# Patient Record
Sex: FEMALE | Race: WHITE | NOT HISPANIC OR LATINO | Employment: FULL TIME | URBAN - METROPOLITAN AREA
[De-identification: names, ages, dates, MRNs, and addresses within clinical notes are randomized per-mention and may not be internally consistent; named-entity substitution may affect disease eponyms.]

---

## 2017-02-08 ENCOUNTER — TELEPHONE (OUTPATIENT)
Dept: OTOLARYNGOLOGY | Facility: CLINIC | Age: 57
End: 2017-02-08

## 2017-02-09 ENCOUNTER — TELEPHONE (OUTPATIENT)
Dept: OTOLARYNGOLOGY | Facility: CLINIC | Age: 57
End: 2017-02-09

## 2017-03-22 ENCOUNTER — CLINICAL SUPPORT (OUTPATIENT)
Dept: INFECTIOUS DISEASES | Facility: CLINIC | Age: 57
End: 2017-03-22
Payer: COMMERCIAL

## 2017-03-22 ENCOUNTER — OFFICE VISIT (OUTPATIENT)
Dept: OTOLARYNGOLOGY | Facility: CLINIC | Age: 57
End: 2017-03-22
Payer: COMMERCIAL

## 2017-03-22 ENCOUNTER — CLINICAL SUPPORT (OUTPATIENT)
Dept: AUDIOLOGY | Facility: CLINIC | Age: 57
End: 2017-03-22
Payer: COMMERCIAL

## 2017-03-22 VITALS — BODY MASS INDEX: 40.14 KG/M2 | HEIGHT: 65 IN | WEIGHT: 240.94 LBS

## 2017-03-22 DIAGNOSIS — H90.3 SENSORINEURAL HEARING LOSS, BILATERAL: Primary | ICD-10-CM

## 2017-03-22 PROCEDURE — 99999 PR PBB SHADOW E&M-EST. PATIENT-LVL III: CPT | Mod: PBBFAC,,, | Performed by: OTOLARYNGOLOGY

## 2017-03-22 PROCEDURE — 99203 OFFICE O/P NEW LOW 30 MIN: CPT | Mod: S$GLB,,, | Performed by: OTOLARYNGOLOGY

## 2017-03-22 PROCEDURE — 92550 TYMPANOMETRY & REFLEX THRESH: CPT | Mod: S$GLB,,, | Performed by: AUDIOLOGIST

## 2017-03-22 PROCEDURE — 92557 COMPREHENSIVE HEARING TEST: CPT | Mod: S$GLB,,, | Performed by: AUDIOLOGIST

## 2017-03-22 PROCEDURE — 90670 PCV13 VACCINE IM: CPT | Mod: S$GLB,,, | Performed by: INTERNAL MEDICINE

## 2017-03-22 PROCEDURE — 1160F RVW MEDS BY RX/DR IN RCRD: CPT | Mod: S$GLB,,, | Performed by: OTOLARYNGOLOGY

## 2017-03-22 PROCEDURE — 99999 PR PBB SHADOW E&M-EST. PATIENT-LVL I: CPT | Mod: PBBFAC,,,

## 2017-03-22 PROCEDURE — 90471 IMMUNIZATION ADMIN: CPT | Mod: S$GLB,,, | Performed by: INTERNAL MEDICINE

## 2017-03-22 RX ORDER — UMECLIDINIUM BROMIDE AND VILANTEROL TRIFENATATE 62.5; 25 UG/1; UG/1
POWDER RESPIRATORY (INHALATION)
Refills: 0 | COMMUNITY
Start: 2017-01-31

## 2017-03-22 RX ORDER — MONTELUKAST SODIUM 10 MG/1
TABLET ORAL
Refills: 2 | COMMUNITY
Start: 2017-02-08

## 2017-03-22 RX ORDER — LOSARTAN POTASSIUM 25 MG/1
TABLET ORAL
Refills: 0 | COMMUNITY
Start: 2017-02-08

## 2017-03-22 RX ORDER — HYDROCHLOROTHIAZIDE 12.5 MG/1
TABLET ORAL
Refills: 0 | COMMUNITY
Start: 2017-02-08

## 2017-03-22 NOTE — PROGRESS NOTES
Subjective:       Patient ID: Lis Montoya is a 56 y.o. female.    Chief Complaint: Hearing Loss    HPI   56F presents for CI eval. Reports hearing loss AS since 2005, hearing loss AD progressing over the past 2-3 years. Wears cros hearing aids with some improvement. Misses many words. No know family history of HL. Significant noise exposure at work and in the . Constant tinnitus. No vertigo or history of recurrent infections.    Past Medical History: Patient has a past medical history of COPD (chronic obstructive pulmonary disease) and Hypertension.    Past Surgical History: Patient has a past surgical history that includes Cholecystectomy.    Social History: Patient reports that she has been smoking.  She does not have any smokeless tobacco history on file. She reports that she does not drink alcohol.    Family History: family history is not on file.    Medications:   Current Outpatient Prescriptions   Medication Sig    ALBUTEROL INHL Inhale into the lungs.    ANORO ELLIPTA 62.5-25 mcg/actuation DsDv     hydrochlorothiazide (HYDRODIURIL) 12.5 MG Tab     losartan (COZAAR) 25 MG tablet     montelukast (SINGULAIR) 10 mg tablet      No current facility-administered medications for this visit.        Allergies: Patient has No Known Allergies.    Review of Systems   Constitutional: Negative for fever.   HENT: Positive for hearing loss and tinnitus. Negative for ear discharge and ear pain.    Eyes: Negative for photophobia and visual disturbance.   Respiratory: Negative for cough and shortness of breath.    Cardiovascular: Negative for chest pain and palpitations.   Gastrointestinal: Negative for abdominal pain, nausea and vomiting.   Endocrine: Negative for cold intolerance and heat intolerance.   Genitourinary: Negative for dysuria and flank pain.   Musculoskeletal: Negative for back pain and neck pain.   Skin: Negative for rash.   Allergic/Immunologic: Negative for environmental allergies and  immunocompromised state.   Neurological: Negative for dizziness, facial asymmetry, light-headedness and headaches.   Hematological: Negative for adenopathy.   Psychiatric/Behavioral: Negative for behavioral problems.           Objective:      Physical Exam   Constitutional: She is oriented to person, place, and time. She appears well-developed and well-nourished. No distress.   HENT:   Head: Normocephalic and atraumatic.   Right Ear: Tympanic membrane, external ear and ear canal normal. Tympanic membrane is not scarred and not perforated. Tympanic membrane mobility is normal. No middle ear effusion. Decreased hearing is noted.   Left Ear: External ear and ear canal normal. Tympanic membrane is not scarred and not perforated. Tympanic membrane mobility is normal.  No middle ear effusion. Decreased hearing is noted.   Nose: Nose normal. No nasal deformity or septal deviation.   Mouth/Throat: Uvula is midline, oropharynx is clear and moist and mucous membranes are normal. No oropharyngeal exudate.   Eyes: Conjunctivae and EOM are normal. Pupils are equal, round, and reactive to light.   Neck: Normal range of motion. Neck supple.   Cardiovascular: Regular rhythm and intact distal pulses.    Pulmonary/Chest: No stridor. No respiratory distress.   Musculoskeletal: Normal range of motion.   Lymphadenopathy:     She has no cervical adenopathy.   Neurological: She is alert and oriented to person, place, and time. No cranial nerve deficit.   Skin: Skin is warm and dry.   Psychiatric: She has a normal mood and affect.             Assessment:       1. Asymmetrical hearing loss, bilateral        Plan:       Lis was seen today for hearing loss.    Diagnoses and all orders for this visit:    Asymmetrical hearing loss, bilateral  -     Ambulatory referral to Audiology  -     CT Temporal Bone without contrast; Future    Other orders  -     Pneumococcal Polysaccharide Vaccine (23 Valent) (SQ/IM); Future  -     Pneumococcal  Conjugate Vaccine (13 Valent) (IM); Future       Patient to have cochlear implant evaluation for left ear. If indicated appropriate imaging, medical and insurance approvals will be obtained. Risks, complications, alternatives and goals discussed. This included infection, bleeding, facial nerve and chorda problems, extrusion, failure of device, need for revision and lack of efficacy. All questions were answered.    There is no middle ear infection, the cochlear lumen is suited to implantation and there are no lesions in the auditory nerve or brain.    There are no contraindications to surgery.

## 2017-04-11 ENCOUNTER — HOSPITAL ENCOUNTER (OUTPATIENT)
Dept: RADIOLOGY | Facility: HOSPITAL | Age: 57
Discharge: HOME | End: 2017-04-11
Attending: OTOLARYNGOLOGY
Payer: COMMERCIAL

## 2017-04-11 ENCOUNTER — CLINICAL SUPPORT (OUTPATIENT)
Dept: AUDIOLOGY | Facility: CLINIC | Age: 57
End: 2017-04-11
Payer: COMMERCIAL

## 2017-04-11 DIAGNOSIS — H90.3 SENSORINEURAL HEARING LOSS, BILATERAL: Primary | ICD-10-CM

## 2017-04-11 PROCEDURE — 70480 CT ORBIT/EAR/FOSSA W/O DYE: CPT | Mod: 26,,, | Performed by: RADIOLOGY

## 2017-04-11 PROCEDURE — 70480 CT ORBIT/EAR/FOSSA W/O DYE: CPT | Mod: TC

## 2017-04-11 PROCEDURE — 92626 EVAL AUD FUNCJ 1ST HOUR: CPT | Mod: S$GLB,,, | Performed by: AUDIOLOGIST

## 2017-04-11 NOTE — PROGRESS NOTES
COCHLEAR IMPLANT EVALUATION  Lis Montoya, 56 y.o., was referred to our cochlear implant team by Dr. Jeremias Ellison on 4/11/2017 for a formal cochlear implant evaluation.      HISTORY:     reported a progressive history of hearing loss in both ears.  The etiology of hearing loss is most consistent with noise exposure.  She has been in the [employer] for 14 years and exposed to high noise levels.  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  She has been wearing hearing aids since 2015.  These aids were issued by the [facility].   currently wears a BiCros configuration.  She perceives minimum benefit with the left aid.       AUDIOLOGICAL EVALUATION:  The results of the audiological evaluation indicated a severe to profound mid to high frequency sensorineural hearing loss in both ears, with the left ear being significantly worse.  Pt reports previously having a MRI with results being negative for an acoustic neuroma.  Speech reception thresholds (SRT) were obtained at 45 dBHL for the right ear and 60dBHL for the left ear.  Speech discrimination scores were 28% for the right ear and 16% for the left ear.     Aided testing was completed in the best aided condition.  Aided results were obtained in the 15-60dBHL range.  Aided SRT was obtained at 30dBHL.  Aided speech discrimination scores in the soundfield was 32%.  Ms. Montoya scored 15% on the Hearing in Noise Test (HINT) in the left ear aided condition.  With the aid in the left ear, she also scored 19% on the [employer] and 4% on the CNC test.  In the best aided condition, right aid alone, she scored 55% on the [employer] test.  With bilateral aids and a +10SNR, she scored 39%.  Based on cochlear implant criteria, Ms. Montoya does qualify for cochlear implantation.  She is receiving minimal benefit with her left hearing aid.      The cochlear implant is the only accepted medical procedure for the treatment of Lis Montoya's  degree of sensorineural hearing loss.  She meets all current standards for cochlear implantation from an audiological standpoint according to Cochlear/FDA guidelines.  The cochlear implant is not provided primarily for the convenience of the patient, physician or healthcare provider, but rather to improve hearing and communicative functioning.  This is the most appropriate device that can be safely provided to the patient.  It will be furnished by [facility] by qualified personnel.      RECOMMENDATIONS:  Based on the results of this evaluation,  would benefit from cochlear implantation from an audiological standpoint.  Appropriate expectations, along with the benefits and limitations of the cochlear implant were discussed with .   acknowledged understanding and indicated she would like to proceed with cochlear implantation on her left ear.  All three cochlear implant companies were discussed.  Ms. Montoya expressed most interest in [employer].  In particular, she liked the KANSO.  Due to her moderate degree of loss in the low frequency area, the array of choice would be the new slim modiolar array-.  She selected the color brown.      Arnulfo Gutiérrez, CCC-A  Audiologist        [facility] Sentence Test 2  Status: Pre-op  Presentation Level  Level: 60 dBSPL  Test Condition   Right Ear: Unaided  Left Ear: Aided   He tried to convince her she was not right.: 2  He was a very dedicated person.: 2  You smell like fresh chacorta.: 0  There are several types of tuxedos.: 3  My life will start at [age].: 2  The post office handles the snail mail.: 0  Self appraisal often underestimates good qualities.: 1  He goes out of his way for you.: 0  There's a fruit fly in the kitchen.: 0  Every crime has a suspect.: 0  The child apparently was not sleepy.: 0  Nothing tastes sweeter than self-discipline.: 0  They acted as if nothing had happened.: 7  I am concerned about your fever.:  2  I hope they catch that most wanted criminal.: 0  Empathy means you feel what another person feels.: 0  The golf  lost control.: 0  Do you want a piece of me?: 1  I could spend hours in the card shop.: 0  She did not like to fly in planes.: 6  Words Correct: 26  Percent Correct: 19 %     HEARING IN NOISE TEST (HINT) 2  Status: Pre-op  Presentation Level  Level: 60 dBSPL  Test Condition   Right Ear: Unaided  Left Ear: Aided   HINT List  1. (A/The) boy ran down (a/the) path.: 0  2. Flowers grow in (a/the) garden.: 0  3. Strawberry jam (is/was) sweet.: 0  4. (A/The) shop closes for lunch.: 1  5. The police helped (a/the) .: 0  6. She looked in her mirror.: 3  7. (A/The) match fell on (a/the) floor.: 0  8. (A/The) fruit came in (a/the) box.: 0  9. He really scared his sister.: 2  10. (A/The) tub faucet (is/was) leaking.: 2  WORDS correct: 8  Percent correct: 15.38 %     HEARING IN NOISE TEST (CNC) 2  CNC List 2   gain: 0  hill: 0  fern: 0  be  late: 0  dire: 0  dodge: 0  love: 0  coat: 0  choose: 0  germ: 0  nurse: 0  jet: 0  lead: 1  rin  root: 1  wa  nose: 1  beam: 0  pave: 0  red: 1  vine: 0  hide: 0  car: 0  puff: 0  Percent Correct: 16  Total Correct: 4 %  CNC LIST 2   sap: 0  much: 0  which: 0  goal: 0  talk: 0  harsh: 0  choise: 0  cob: 0  this: 0  rail: 0  weep: 0  soul: 0  brought: 0  dam: 0  leak: 0  met: 0  south: 0  ship: 0  pan: 0  tire: 0  tall: 0  should: 0  fake: 0  moon: 0  suck: 0  Percent Correct: 8  Total Correct: 4 %  Total Correct:     [employer] Sentence Test 5  Status  Status: Pre-op  Presentation Level  Level: 60 dBSPL  Test Condition   Right Ear: Aided  Left Ear: Unaided   They tried to fashion a weapon from a stick.: 9  The produce at the market was in horrible condition.: 8  The book was about a child sorcerer.: 5  What you call ugly, I call interesting.: 7  You can't change your life without getting out of bed.: 3  Will you  me?: 4  Mom and Dad  offered to pay for the wedding.: 9  A lot of things were said in anger.: 0  Her sister was anorexic as well as self-centered.: 3  These sentences are getting old.: 5  She watched the sneak preview of the new film.: 3  She made a scrapbook of important moments.: 0  The construction workers had a few beers on lunch break.: 5  Young girls should not wear lingerie.: 0  I am recharging the battery.: 5  Clear and refreshing water is the best kind.: 3  If I had hands I'd pat you on the back.: 9  What was the name of that famous ship that sank?: 10  Dad took a fishing trip in order to relax.: 0  The sleeves on the shirt were too long.: 0  Words Correct: 88  Percent Correct: 55 %    [employer] Sentence Test 3  Status: Pre-op  Presentation Level  Level: 60 dBSPL  Noise Level: 50 dBSPL  Test Condition   Right Ear: Aided  Left Ear: Aided   How many times have you given this lecture?: 8  The little girl mistreated the pet cat.: 5  I never knew [employer] was a real place.: 3  I am craving chocolate chip cookies.: 2  Let's do something we have never done.: 5  She made her own business cards on the computer.: 3  The dog's paws were frostbitten.: 1  Do you still have a lizard?: 2  He works four ten-hour shifts.: 0  This is my favorite song.: 5  Meditation brings a higher level of awareness.: 5  Could you make the shake thicker please?: 0  He put a horse's head in their bed.: 3  Have a nice lunch.: 3  Her fake nails were unnaturally thick.: 0  She was top banana in the shock department.: 0  You can rest assured.: 2  The puppies will train to be [employer] dogs.: 0  If I had a gold star you'd be wearing it.: 2  She wanted to follow in his footsteps.: 5  Words Correct: 54  Percent Correct: 39 %

## 2017-05-22 ENCOUNTER — CLINICAL SUPPORT (OUTPATIENT)
Dept: INFECTIOUS DISEASES | Facility: CLINIC | Age: 57
End: 2017-05-22
Payer: COMMERCIAL

## 2017-05-22 PROCEDURE — 90732 PPSV23 VACC 2 YRS+ SUBQ/IM: CPT | Mod: S$GLB,,, | Performed by: INTERNAL MEDICINE

## 2017-05-22 PROCEDURE — 90471 IMMUNIZATION ADMIN: CPT | Mod: S$GLB,,, | Performed by: INTERNAL MEDICINE

## 2017-05-25 ENCOUNTER — TELEPHONE (OUTPATIENT)
Dept: OTOLARYNGOLOGY | Facility: CLINIC | Age: 57
End: 2017-05-25

## 2017-05-25 DIAGNOSIS — H90.3 SENSORY HEARING LOSS, BILATERAL: Primary | ICD-10-CM

## 2017-07-11 ENCOUNTER — TELEPHONE (OUTPATIENT)
Dept: OTOLARYNGOLOGY | Facility: CLINIC | Age: 57
End: 2017-07-11

## 2017-07-12 ENCOUNTER — ANESTHESIA EVENT (OUTPATIENT)
Dept: SURGERY | Facility: HOSPITAL | Age: 57
End: 2017-07-12
Payer: COMMERCIAL

## 2017-07-13 ENCOUNTER — HOSPITAL ENCOUNTER (OUTPATIENT)
Facility: HOSPITAL | Age: 57
Discharge: HOME | End: 2017-07-13
Attending: OTOLARYNGOLOGY | Admitting: OTOLARYNGOLOGY
Payer: COMMERCIAL

## 2017-07-13 ENCOUNTER — ANESTHESIA (OUTPATIENT)
Dept: SURGERY | Facility: HOSPITAL | Age: 57
End: 2017-07-13
Payer: COMMERCIAL

## 2017-07-13 ENCOUNTER — SURGERY (OUTPATIENT)
Age: 57
End: 2017-07-13

## 2017-07-13 VITALS
HEART RATE: 81 BPM | DIASTOLIC BLOOD PRESSURE: 55 MMHG | WEIGHT: 240 LBS | RESPIRATION RATE: 16 BRPM | BODY MASS INDEX: 39.99 KG/M2 | SYSTOLIC BLOOD PRESSURE: 102 MMHG | OXYGEN SATURATION: 95 % | TEMPERATURE: 98 F | HEIGHT: 65 IN

## 2017-07-13 DIAGNOSIS — H91.8X3 ASYMMETRICAL HEARING LOSS, UNSPECIFIED LATERALITY: Primary | ICD-10-CM

## 2017-07-13 DIAGNOSIS — H91.8X3 ASYMMETRICAL HEARING LOSS: ICD-10-CM

## 2017-07-13 PROCEDURE — 71000015 HC POSTOP RECOV 1ST HR: Performed by: OTOLARYNGOLOGY

## 2017-07-13 PROCEDURE — 63600175 PHARM REV CODE 636 W HCPCS: Performed by: NURSE ANESTHETIST, CERTIFIED REGISTERED

## 2017-07-13 PROCEDURE — 69930 IMPLANT COCHLEAR DEVICE: CPT | Mod: LT,,, | Performed by: OTOLARYNGOLOGY

## 2017-07-13 PROCEDURE — 36000711: Performed by: OTOLARYNGOLOGY

## 2017-07-13 PROCEDURE — 27201423 OPTIME MED/SURG SUP & DEVICES STERILE SUPPLY: Performed by: OTOLARYNGOLOGY

## 2017-07-13 PROCEDURE — 71000033 HC RECOVERY, INTIAL HOUR: Performed by: OTOLARYNGOLOGY

## 2017-07-13 PROCEDURE — 25000003 PHARM REV CODE 250: Performed by: OTOLARYNGOLOGY

## 2017-07-13 PROCEDURE — 25000003 PHARM REV CODE 250: Performed by: NURSE ANESTHETIST, CERTIFIED REGISTERED

## 2017-07-13 PROCEDURE — 63600175 PHARM REV CODE 636 W HCPCS: Performed by: ANESTHESIOLOGY

## 2017-07-13 PROCEDURE — 37000009 HC ANESTHESIA EA ADD 15 MINS: Performed by: OTOLARYNGOLOGY

## 2017-07-13 PROCEDURE — 37000008 HC ANESTHESIA 1ST 15 MINUTES: Performed by: OTOLARYNGOLOGY

## 2017-07-13 PROCEDURE — 63600175 PHARM REV CODE 636 W HCPCS: Performed by: OTOLARYNGOLOGY

## 2017-07-13 PROCEDURE — D9220A PRA ANESTHESIA: Mod: CRNA,,, | Performed by: NURSE ANESTHETIST, CERTIFIED REGISTERED

## 2017-07-13 PROCEDURE — D9220A PRA ANESTHESIA: Mod: ANES,,, | Performed by: ANESTHESIOLOGY

## 2017-07-13 PROCEDURE — L8614 COCHLEAR DEVICE: HCPCS | Performed by: OTOLARYNGOLOGY

## 2017-07-13 PROCEDURE — 36000710: Performed by: OTOLARYNGOLOGY

## 2017-07-13 PROCEDURE — 25000242 PHARM REV CODE 250 ALT 637 W/ HCPCS: Performed by: NURSE ANESTHETIST, CERTIFIED REGISTERED

## 2017-07-13 DEVICE — IMPLANTABLE DEVICE: Type: IMPLANTABLE DEVICE | Site: EAR | Status: FUNCTIONAL

## 2017-07-13 RX ORDER — PROPOFOL 10 MG/ML
VIAL (ML) INTRAVENOUS
Status: DISCONTINUED | OUTPATIENT
Start: 2017-07-13 | End: 2017-07-13

## 2017-07-13 RX ORDER — OXYCODONE AND ACETAMINOPHEN 5; 325 MG/1; MG/1
1 TABLET ORAL ONCE
Status: COMPLETED | OUTPATIENT
Start: 2017-07-13 | End: 2017-07-13

## 2017-07-13 RX ORDER — ONDANSETRON 2 MG/ML
INJECTION INTRAMUSCULAR; INTRAVENOUS
Status: DISCONTINUED | OUTPATIENT
Start: 2017-07-13 | End: 2017-07-13

## 2017-07-13 RX ORDER — ALBUTEROL SULFATE 90 UG/1
AEROSOL, METERED RESPIRATORY (INHALATION)
Status: DISCONTINUED | OUTPATIENT
Start: 2017-07-13 | End: 2017-07-13

## 2017-07-13 RX ORDER — SODIUM CHLORIDE 0.9 % (FLUSH) 0.9 %
3 SYRINGE (ML) INJECTION
Status: DISCONTINUED | OUTPATIENT
Start: 2017-07-13 | End: 2017-07-13 | Stop reason: HOSPADM

## 2017-07-13 RX ORDER — SODIUM CHLORIDE 9 MG/ML
INJECTION, SOLUTION INTRAVENOUS CONTINUOUS PRN
Status: DISCONTINUED | OUTPATIENT
Start: 2017-07-13 | End: 2017-07-13

## 2017-07-13 RX ORDER — ZOLPIDEM TARTRATE 10 MG/1
10 TABLET ORAL NIGHTLY PRN
COMMUNITY

## 2017-07-13 RX ORDER — SCOLOPAMINE TRANSDERMAL SYSTEM 1 MG/1
PATCH, EXTENDED RELEASE TRANSDERMAL
Status: DISCONTINUED | OUTPATIENT
Start: 2017-07-13 | End: 2017-07-13

## 2017-07-13 RX ORDER — AMOXICILLIN 500 MG/1
500 CAPSULE ORAL 2 TIMES DAILY
Qty: 20 CAPSULE | Refills: 0 | Status: SHIPPED | OUTPATIENT
Start: 2017-07-13

## 2017-07-13 RX ORDER — ROCURONIUM BROMIDE 10 MG/ML
INJECTION, SOLUTION INTRAVENOUS
Status: DISCONTINUED | OUTPATIENT
Start: 2017-07-13 | End: 2017-07-13

## 2017-07-13 RX ORDER — PHENYLEPHRINE HYDROCHLORIDE 10 MG/ML
INJECTION INTRAVENOUS
Status: DISCONTINUED | OUTPATIENT
Start: 2017-07-13 | End: 2017-07-13

## 2017-07-13 RX ORDER — ONDANSETRON 4 MG/1
4 TABLET, ORALLY DISINTEGRATING ORAL EVERY 8 HOURS PRN
Qty: 20 TABLET | Refills: 0 | Status: SHIPPED | OUTPATIENT
Start: 2017-07-13

## 2017-07-13 RX ORDER — FENTANYL CITRATE 50 UG/ML
INJECTION, SOLUTION INTRAMUSCULAR; INTRAVENOUS
Status: DISCONTINUED | OUTPATIENT
Start: 2017-07-13 | End: 2017-07-13

## 2017-07-13 RX ORDER — MIDAZOLAM HYDROCHLORIDE 1 MG/ML
INJECTION, SOLUTION INTRAMUSCULAR; INTRAVENOUS
Status: DISCONTINUED | OUTPATIENT
Start: 2017-07-13 | End: 2017-07-13

## 2017-07-13 RX ORDER — ACETAMINOPHEN 10 MG/ML
INJECTION, SOLUTION INTRAVENOUS
Status: DISCONTINUED | OUTPATIENT
Start: 2017-07-13 | End: 2017-07-13

## 2017-07-13 RX ORDER — FENTANYL CITRATE 50 UG/ML
25 INJECTION, SOLUTION INTRAMUSCULAR; INTRAVENOUS EVERY 5 MIN PRN
Status: DISCONTINUED | OUTPATIENT
Start: 2017-07-13 | End: 2017-07-13 | Stop reason: HOSPADM

## 2017-07-13 RX ORDER — CEFAZOLIN SODIUM 2 G/50ML
2 SOLUTION INTRAVENOUS
Status: COMPLETED | OUTPATIENT
Start: 2017-07-13 | End: 2017-07-13

## 2017-07-13 RX ORDER — SUCCINYLCHOLINE CHLORIDE 20 MG/ML
INJECTION INTRAMUSCULAR; INTRAVENOUS
Status: DISCONTINUED | OUTPATIENT
Start: 2017-07-13 | End: 2017-07-13

## 2017-07-13 RX ORDER — LIDOCAINE HYDROCHLORIDE AND EPINEPHRINE 10; 10 MG/ML; UG/ML
INJECTION, SOLUTION INFILTRATION; PERINEURAL
Status: DISCONTINUED | OUTPATIENT
Start: 2017-07-13 | End: 2017-07-13 | Stop reason: HOSPADM

## 2017-07-13 RX ORDER — DEXMEDETOMIDINE HYDROCHLORIDE 100 UG/ML
INJECTION, SOLUTION INTRAVENOUS
Status: DISCONTINUED | OUTPATIENT
Start: 2017-07-13 | End: 2017-07-13

## 2017-07-13 RX ORDER — OXYCODONE AND ACETAMINOPHEN 5; 325 MG/1; MG/1
1 TABLET ORAL EVERY 4 HOURS PRN
Qty: 30 TABLET | Refills: 0 | Status: SHIPPED | OUTPATIENT
Start: 2017-07-13

## 2017-07-13 RX ORDER — LIDOCAINE HCL/PF 100 MG/5ML
SYRINGE (ML) INTRAVENOUS
Status: DISCONTINUED | OUTPATIENT
Start: 2017-07-13 | End: 2017-07-13

## 2017-07-13 RX ORDER — DEXAMETHASONE SODIUM PHOSPHATE 4 MG/ML
INJECTION, SOLUTION INTRA-ARTICULAR; INTRALESIONAL; INTRAMUSCULAR; INTRAVENOUS; SOFT TISSUE
Status: DISCONTINUED | OUTPATIENT
Start: 2017-07-13 | End: 2017-07-13

## 2017-07-13 RX ORDER — METHYLENE BLUE 10 MG/ML
INJECTION INTRAVENOUS
Status: DISCONTINUED | OUTPATIENT
Start: 2017-07-13 | End: 2017-07-13 | Stop reason: HOSPADM

## 2017-07-13 RX ORDER — OXYCODONE AND ACETAMINOPHEN 5; 325 MG/1; MG/1
TABLET ORAL
Status: DISCONTINUED
Start: 2017-07-13 | End: 2017-07-13 | Stop reason: HOSPADM

## 2017-07-13 RX ADMIN — SUCCINYLCHOLINE CHLORIDE 140 MG: 20 INJECTION, SOLUTION INTRAMUSCULAR; INTRAVENOUS at 09:07

## 2017-07-13 RX ADMIN — PHENYLEPHRINE HYDROCHLORIDE 200 MCG: 10 INJECTION INTRAVENOUS at 09:07

## 2017-07-13 RX ADMIN — FENTANYL CITRATE 100 MCG: 50 INJECTION, SOLUTION INTRAMUSCULAR; INTRAVENOUS at 09:07

## 2017-07-13 RX ADMIN — DEXMEDETOMIDINE HYDROCHLORIDE 4 MCG: 100 INJECTION, SOLUTION, CONCENTRATE INTRAVENOUS at 11:07

## 2017-07-13 RX ADMIN — ONDANSETRON 4 MG: 2 INJECTION INTRAMUSCULAR; INTRAVENOUS at 09:07

## 2017-07-13 RX ADMIN — FENTANYL CITRATE 50 MCG: 50 INJECTION, SOLUTION INTRAMUSCULAR; INTRAVENOUS at 09:07

## 2017-07-13 RX ADMIN — SCOPALAMINE 1 PATCH: 1 PATCH, EXTENDED RELEASE TRANSDERMAL at 09:07

## 2017-07-13 RX ADMIN — PHENYLEPHRINE HYDROCHLORIDE 50 MCG: 10 INJECTION INTRAVENOUS at 10:07

## 2017-07-13 RX ADMIN — ALBUTEROL SULFATE 2 PUFF: 90 AEROSOL, METERED RESPIRATORY (INHALATION) at 09:07

## 2017-07-13 RX ADMIN — SODIUM CHLORIDE: 0.9 INJECTION, SOLUTION INTRAVENOUS at 08:07

## 2017-07-13 RX ADMIN — ROCURONIUM BROMIDE 10 MG: 10 INJECTION, SOLUTION INTRAVENOUS at 09:07

## 2017-07-13 RX ADMIN — EPHEDRINE SULFATE 5 MG: 50 INJECTION, SOLUTION INTRAMUSCULAR; INTRAVENOUS; SUBCUTANEOUS at 10:07

## 2017-07-13 RX ADMIN — EPHEDRINE SULFATE 10 MG: 50 INJECTION, SOLUTION INTRAMUSCULAR; INTRAVENOUS; SUBCUTANEOUS at 10:07

## 2017-07-13 RX ADMIN — LIDOCAINE HYDROCHLORIDE 80 MG: 20 INJECTION, SOLUTION INTRAVENOUS at 09:07

## 2017-07-13 RX ADMIN — METHYLENE BLUE 3 MG: 10 INJECTION, SOLUTION INTRAVENOUS at 10:07

## 2017-07-13 RX ADMIN — PROPOFOL 200 MG: 10 INJECTION, EMULSION INTRAVENOUS at 09:07

## 2017-07-13 RX ADMIN — LIDOCAINE HYDROCHLORIDE AND EPINEPHRINE 10 ML: 10; 10 INJECTION, SOLUTION INFILTRATION; PERINEURAL at 10:07

## 2017-07-13 RX ADMIN — FENTANYL CITRATE 25 MCG: 50 INJECTION, SOLUTION INTRAMUSCULAR; INTRAVENOUS at 11:07

## 2017-07-13 RX ADMIN — PHENYLEPHRINE HYDROCHLORIDE 100 MCG: 10 INJECTION INTRAVENOUS at 10:07

## 2017-07-13 RX ADMIN — CEFAZOLIN SODIUM 2 G: 2 SOLUTION INTRAVENOUS at 10:07

## 2017-07-13 RX ADMIN — FENTANYL CITRATE 25 MCG: 50 INJECTION INTRAMUSCULAR; INTRAVENOUS at 11:07

## 2017-07-13 RX ADMIN — PHENYLEPHRINE HYDROCHLORIDE 100 MCG: 10 INJECTION INTRAVENOUS at 09:07

## 2017-07-13 RX ADMIN — SODIUM CHLORIDE, SODIUM GLUCONATE, SODIUM ACETATE, POTASSIUM CHLORIDE, MAGNESIUM CHLORIDE, SODIUM PHOSPHATE, DIBASIC, AND POTASSIUM PHOSPHATE: .53; .5; .37; .037; .03; .012; .00082 INJECTION, SOLUTION INTRAVENOUS at 09:07

## 2017-07-13 RX ADMIN — ACETAMINOPHEN 1000 MG: 10 INJECTION, SOLUTION INTRAVENOUS at 11:07

## 2017-07-13 RX ADMIN — OXYCODONE HYDROCHLORIDE AND ACETAMINOPHEN 1 TABLET: 5; 325 TABLET ORAL at 11:07

## 2017-07-13 RX ADMIN — DEXAMETHASONE SODIUM PHOSPHATE 4 MG: 4 INJECTION, SOLUTION INTRAMUSCULAR; INTRAVENOUS at 09:07

## 2017-07-13 RX ADMIN — MIDAZOLAM HYDROCHLORIDE 2 MG: 1 INJECTION, SOLUTION INTRAMUSCULAR; INTRAVENOUS at 09:07

## 2017-07-13 NOTE — OP NOTE
Pre Op Dx:Sensorineural Hearing Loss    Post Op Dx: Same.    Operative Procedure: Nucleus 24 Channel 532 Cochlear Implantation with use of Operating Microscope and Facial Nerve Monitor.Left    Surgeon: Jasper Ellison M.D.    Assist:Juventino    7/13/17    Estimated Blood Loss: 5 cc    Anesthesia: GET.    Operative Procedure in Detail:    The patient was brought to the operating room and placed in the supine position. After general endotracheal anesthesia was induced the patient was prepped and draped in there usual fashion for post auricular cochlear  implant surgery. Using the implant guides the front corner of the implant bed was marked on the lateral musculoperiosteum with an injection of methylene blue.  A C shaped incision was outlined in the postauricular area and infiltrated with 1% xylocaine with 1/100,000 epinephrine solution. The incision was made and carried down to the level of the temporalis muscle.  Hemostasis was obtained. A posteriorly directed subcutaneous flap was created to the level of the methelene blue shani. Retractors were placed. An anterior based musculoperiosteal flap was incised and elevated to the level of the ear canal. Emissary veins were managed with bone wax and electrocautery. A posterior, superior subperiosteal flap was then elevated for the implant. Using the implant guides a trough for the implant electrodes were created in the lateral temporal bone at the site of the methylene blue shani to a depth of 5 mm. A posterior, superior ramp was then drilled for the implant antennae. A complete simple mastoidectomy was next done and the facial recess opened using a 1.5 mm eric demar. The round window niche was identified.  Musculoperiosteum was harvested for later cochlear packing. Final hemostasis and irrigation was then done. A trough was created underneath the temporalis muscle for the ground electrode. Using micro suction, a 1 mm demar and soft surgical techniques the round window was  exposed and opened with the [address] [place] blade. The implant was then brought on to the field and placed into the subperiosteal pocket. The active electrode was introduced into the round window and advanced off sheath until a full insertion had been obtained. The previously harvested fibrous tissue was then used to pack the window to affect a seal. The ground electrode was placed in the superior trough and under the temporalis muscle.  The electrodes were then coiled into the mastoid defect. Final hemostasis was obtained with bipolar electrocautery only. Irrigation was done and the wound was closed in layers. A sterile pressure dressing was applied. The patient tolerated the procedure well. There were no intraoperative complications.

## 2017-07-13 NOTE — BRIEF OP NOTE
[facility]  Otolaryngology Short Stay Form  Operative Note/Discharge Summary    OPERATIVE NOTE     Date of Procedure: 7/13/2017     Procedure: Procedure(s) (LRB):  IMPLANTATION-COCHLEAR DEVICE (Left)     Surgeon(s) and Role:     * Jasper Ellison MD - Primary     * Yamil Jauregui MD - Resident - Assisting    Pre-Operative Diagnosis: Sensory hearing loss, bilateral [H90.3].  Asymmetric hearing loss    Post-Operative Diagnosis: Post-Op Diagnosis Codes:   Same    Anesthesia: General    Technical Procedures Used: See operative dictation.  Facial Monitoring used throughout the case.     Description of the Findings of the Procedure: Please see operative dictation.     Significant Surgical Tasks Conducted by the Assistant(s), if Applicable: N/A    Complications: No    Total IV Fluids: Please refer to anesthesia log    Estimated Blood Loss (EBL): minimal           Drains: N/A    Implants:   Implant Name Type Inv. Item Serial No.  Lot No. LRB No. Used   NUCLEUS PROF MODIOLAR ELECTRD - [id number]   NUCLEUS PROF MODIOLAR ELECTRD   COCHLEAR JUAN ALBERTO [id number] Left 1       Specimens:   Specimen (12h ago through future)    None                  Condition: Stable    Disposition: PACU - hemodynamically stable.    Attestation: I was present and scrubbed for the entire procedure.    DISCHARGE SUMMARY     Admit Date: 7/13/2017    Discharge Date and Time:  07/13/2017 11:18 AM    Attending Physician: Jasper Ellison MD     Discharge Physician: Yamil Jauregui    Hospital Course Following completion of an electively scheduled procedure, she was transferred to the PACU for postoperative monitoring. her hospital course was uneventful and noted for adequate pain control and PO intake following surgery. she is discharged home in good condition and will follow-up with Dr. Ellison in 1 day.    Final Diagnoses:    Principal Problem: Asymmetrical hearing loss   Secondary Diagnoses:   Active Hospital Problems     Diagnosis  POA    *Asymmetrical hearing loss [H91.8X9]  Yes      Resolved Hospital Problems    Diagnosis Date Resolved POA   No resolved problems to display.       Disposition: Home or Self Care    Follow Up/Patient Instructions:   Medications:  Reconciled Home Medications:   Current Discharge Medication List      START taking these medications    Details   amoxicillin (AMOXIL) 500 MG capsule Take 1 capsule (500 mg total) by mouth 2 (two) times daily.  Qty: 20 capsule, Refills: 0      ondansetron (ZOFRAN-ODT) 4 MG TbDL Take 1 tablet (4 mg total) by mouth every 8 (eight) hours as needed (Nausea/vomiting).  Qty: 20 tablet, Refills: 0      oxycodone-acetaminophen (PERCOCET) 5-325 mg per tablet Take 1 tablet by mouth every 4 (four) hours as needed for Pain.  Qty: 30 tablet, Refills: 0         CONTINUE these medications which have NOT CHANGED    Details   ALBUTEROL INHL Inhale into the lungs.      ANORO ELLIPTA 62.5-25 mcg/actuation DsDv Refills: 0      hydrochlorothiazide (HYDRODIURIL) 12.5 MG Tab Refills: 0      losartan (COZAAR) 25 MG tablet Refills: 0      montelukast (SINGULAIR) 10 mg tablet Refills: 2      zolpidem (AMBIEN) 10 mg Tab Take 10 mg by mouth nightly as needed.             Discharge Procedure Orders  Diet general     Lifting restrictions   Order Comments: No heavy lifting > 10 lbs x 2 weeks.  No driving while on narcotic pain medication.     Call MD for:  temperature >100.4     Call MD for:  redness, tenderness, or signs of infection (pain, swelling, redness, odor or green/yellow discharge around incision site)     Leave dressing on - Keep it clean, dry, and intact until clinic visit   Order Comments: Ear dressing will be removed in clinic 7/14/17 - please do not remove.       Follow-up Information     Jasper Ellison MD. Go on 7/14/2017.    Specialty:  Otolaryngology  Why:  For wound re-check  Contact information:  [address]  [facility] [address]  [phone]                   Attestation:   I was present and scrubbed for the entire procedure.

## 2017-07-13 NOTE — ANESTHESIA POSTPROCEDURE EVALUATION
"Anesthesia Post Evaluation    Patient: Lis Montoya    Procedure(s) Performed: Procedure(s) (LRB):  IMPLANTATION-COCHLEAR DEVICE (Left)    Final Anesthesia Type: general  Patient location during evaluation: PACU  Patient participation: Yes- Able to Participate  Level of consciousness: awake and alert  Post-procedure vital signs: reviewed and stable  Pain management: adequate  Airway patency: patent  PONV status at discharge: No PONV  Anesthetic complications: no      Cardiovascular status: blood pressure returned to baseline  Respiratory status: unassisted, spontaneous ventilation and room air  Hydration status: euvolemic  Follow-up not needed.        Visit Vitals  BP (!) 102/55   Pulse 81   Temp 36.5 °C (97.7 °F) (Temporal)   Resp 16   Ht 5' 5" (1.651 m)   Wt 108.9 kg (240 lb)   SpO2 95%   Breastfeeding? No   BMI 39.94 kg/m²       Pain/Guerrero Score: Pain Assessment Performed: Yes (7/13/2017  1:00 PM)  Presence of Pain: denies (7/13/2017  1:00 PM)  Pain Rating Prior to Med Admin: 3 (7/13/2017 11:46 AM)  Guerrero Score: 10 (7/13/2017 12:19 PM)      "

## 2017-07-13 NOTE — ANESTHESIA PREPROCEDURE EVALUATION
07/13/2017  Lis Montoya is a 56 y.o., female.    Anesthesia Evaluation    I have reviewed the Patient Summary Reports.    I have reviewed the Nursing Notes.      Review of Systems  Anesthesia Hx:  Denies Hx of Anesthetic complications    Social:  Smoker    Cardiovascular:   Exercise tolerance: good Hypertension Denies CAD.     Denies Angina.        Pulmonary:   COPD, mild Denies Shortness of breath.  Denies Sleep Apnea.    Renal/:   Denies Chronic Renal Disease.     Hepatic/GI:   Denies GERD. Denies Liver Disease.    Neurological:   Denies CVA. Denies Seizures.    Endocrine:   Denies Diabetes. Denies Hypothyroidism.        Physical Exam  General:  Obesity    Airway/Jaw/Neck:  Airway Findings: Mallampati: III Improves to II with phonation.  TM Distance: Normal, at least 6 cm  Jaw/Neck Findings:  Neck ROM: Normal ROM       Chest/Lungs:  Chest/Lungs Findings: Normal Respiratory Rate, Clear to auscultation     Heart/Vascular:  Heart Findings: Rate: Normal        Mental Status:  Mental Status Findings:  Cooperative, Alert and Oriented         Anesthesia Plan  Type of Anesthesia, risks & benefits discussed:  Anesthesia Type:  general  Patient's Preference: GA  Intra-op Monitoring Plan: standard ASA monitors  Intra-op Monitoring Plan Comments:   Post Op Pain Control Plan: multimodal analgesia, IV/PO Opiods PRN and per primary service following discharge from PACU  Post Op Pain Control Plan Comments:   Induction:   IV  Beta Blocker:  Patient is not currently on a Beta-Blocker (No further documentation required).       Informed Consent: Patient understands risks and agrees with Anesthesia plan.  Questions answered. Anesthesia consent signed with patient.  ASA Score: 2     Day of Surgery Review of History & Physical:    H&P update referred to the surgeon.     Anesthesia Plan Notes: The patient's PMH was  reviewed and PE was performed  Plan for GA        Ready For Surgery From Anesthesia Perspective.

## 2017-07-13 NOTE — DISCHARGE INSTRUCTIONS
Cochlear Implant Surgery  A cochlear implant is a device that helps reverse nerve-related hearing loss. It can treat hearing loss that will not respond to hearing aids. During cochlear implant surgery, the device is implanted into the inner ear (cochlea). A few weeks after surgery, the device is activated and hearing is restored. Typically, only 1 implant is placed. But, if needed, an implant can be placed in both ears. You and your healthcare provider will discuss whats best for you.    Preparing for surgery  Prepare for the procedure as you have been instructed. Be sure to tell your healthcare provider about all medicines you take. This includes over-the-counter drugs. It also includes herbs and other supplements. You may need to stop taking some or all of them before surgery as directed by your healthcare provider. Also, follow any directions youre given for not eating or drinking before surgery.  The day of surgery  The surgery takes 2 to 3 hours. Before the surgery begins:  · An IV line is put into a vein in your arm or hand. This line delivers fluids and medicines.  · You will be given medicine (anesthesia) to keep you free of pain during the surgery. You will have general anesthesia. This puts you into a state like deep sleep during the surgery.  · The area around the implant site will be shaved.  During the surgery  · The surgeon makes an incision behind the ear. The mastoid bone is exposed. This is the bone you can feel behind the ear.  · The mastoid bone is opened with a surgical drill. Great care is taken to avoid harming the facial nerve, which runs through the bone. This nerve controls your facial muscles. A facial nerve monitor (a machine with a small sensor that is put onto your cheek) may be used to map the nerves exact location. This helps avoid damage.  · The cochlear implant is placed inside the hole in the mastoid bone.  · A group of electrodes called the electrode array is attached. This is  placed into the inner ear.  · More bone is removed from behind the ear. The /stimulator is then placed in this hole. This allows the unit to lie flat under the skin.  · The skin incision behind the ear is closed with sutures.  · If an implant is being placed in the other ear, this may be done at this time.  After the surgery  You will be taken to a recovery room to wake up from the anesthesia. You may be sleepy and nauseated at first. And you may feel dizzy. You will be given medicine to manage any pain. You may then be taken to a hospital room to stay overnight. Once you are ready to go home, you will be released to an adult family member or friend. Have someone stay with you for the next couple of days to help care for you as your healing begins.  Recovering at home  Recovery time varies for each person. Your healthcare provider will tell you when you can return to your normal routine. Once at home, follow the instructions you have been given. While you recover:  · Take prescribed pain medicine exactly as directed. Take it on time. Do not wait for the pain to get bad before you take it.  · For 3 to 5 days after surgery, sleep with your head raised above the level of your heart. This helps reduce swelling.  · Do not drive until your healthcare provider says its OK.  · Care for incisions as instructed by your healthcare provider.  When to call your healthcare provider  Be sure you have a contact number for your healthcare provider. After you get home, call if you have any of the following:  · Chest pain or trouble breathing (call 911 or other emergency service)  · Fever of 100.4°F (38°C) or higher, or as directed by your healthcare provider  · Pain that does not get better with medicine  · Symptoms of infection at an incision site, like increased redness or swelling, warmth, worsening pain, or foul-smelling drainage  · Signs of meningitis, like increasing neck stiffness, sensitivity to light, dizziness that  gets worse, or facial weakness (note: meningitis could happen months after surgery)   Follow-up  During follow-up visits, your healthcare provider will check your healing. Stitches or staples will be removed 7 to 10 days after the surgery. When the incision has healed, the outer part of the implant is attached behind your ear. This will allow the device to work. Continue to follow up with your healthcare provider as directed. Speech and hearing specialists will help you adjust to your implant.  Risks and possible complications  Risks of cochlear implant surgery include:  · Bleeding  · Infection  · Temporary dizziness  · Severe vertigo (dizziness) that lasts up to 6 weeks  · Tinnitus (ringing or buzzing in your ears)  · Device eroding through the skin  · Facial nerve paralysis  · Damage to nerves and blood vessels at or near the incision site  · Leakage of brain fluid  · Device failure  · Risks of anesthesia    Date Last Reviewed: 6/11/2015  © 9720-7036 [employer]. [address] [place] [address], [place] [address]. All rights reserved. This information is not intended as a substitute for professional medical care. Always follow your healthcare professional's instructions.

## 2017-07-13 NOTE — TRANSFER OF CARE
"Anesthesia Transfer of Care Note    Patient: Lis Montoya    Procedure(s) Performed: Procedure(s) (LRB):  IMPLANTATION-COCHLEAR DEVICE (Left)    Patient location: PACU    Anesthesia Type: general    Transport from OR: Transported from OR on 6-10 L/min O2 by face mask with adequate spontaneous ventilation    Post pain: adequate analgesia    Post assessment: no apparent anesthetic complications and tolerated procedure well    Post vital signs: stable    Level of consciousness: awake and responds to stimulation    Nausea/Vomiting: no nausea/vomiting    Complications: none    Transfer of care protocol was followed      Last vitals:   Visit Vitals  BP (!) 115/54 (BP Location: Right arm, Patient Position: Lying, BP Method: Automatic)   Pulse 89   Temp 36.6 °C (97.9 °F) (Axillary)   Resp 20   Ht 5' 5" (1.651 m)   Wt 108.9 kg (240 lb)   SpO2 96%   Breastfeeding? No   BMI 39.94 kg/m²     "

## 2017-07-13 NOTE — H&P
Patient ID: Lis Montoya is a 56 y.o. female.     Chief Complaint: Hearing Loss     Interval:  No changes per patient since clinic visit.  Denies fevers, chills, changes to medications or recent hospitalizations.     HPI   56F presents for CI eval. Reports hearing loss AS since 2005, hearing loss AD progressing over the past 2-3 years. Wears cros hearing aids with some improvement. Misses many words. No know family history of HL. Significant noise exposure at work and in the . Constant tinnitus. No vertigo or history of recurrent infections.     Past Medical History: Patient has a past medical history of COPD (chronic obstructive pulmonary disease) and Hypertension.     Past Surgical History: Patient has a past surgical history that includes Cholecystectomy.     Social History: Patient reports that she has been smoking.  She does not have any smokeless tobacco history on file. She reports that she does not drink alcohol.     Family History: family history is not on file.     Medications:        Current Outpatient Prescriptions   Medication Sig    ALBUTEROL INHL Inhale into the lungs.    ANORO ELLIPTA 62.5-25 mcg/actuation DsDv      hydrochlorothiazide (HYDRODIURIL) 12.5 MG Tab      losartan (COZAAR) 25 MG tablet      montelukast (SINGULAIR) 10 mg tablet        No current facility-administered medications for this visit.          Allergies: Patient has No Known Allergies.     Review of Systems   Constitutional: Negative for fever.   HENT: Positive for hearing loss and tinnitus. Negative for ear discharge and ear pain.    Eyes: Negative for photophobia and visual disturbance.   Respiratory: Negative for cough and shortness of breath.    Cardiovascular: Negative for chest pain and palpitations.   Gastrointestinal: Negative for abdominal pain, nausea and vomiting.   Endocrine: Negative for cold intolerance and heat intolerance.   Genitourinary: Negative for dysuria and flank pain.   Musculoskeletal:  Negative for back pain and neck pain.   Skin: Negative for rash.   Allergic/Immunologic: Negative for environmental allergies and immunocompromised state.   Neurological: Negative for dizziness, facial asymmetry, light-headedness and headaches.   Hematological: Negative for adenopathy.   Psychiatric/Behavioral: Negative for behavioral problems.             Objective:      Physical Exam   Constitutional: She is oriented to person, place, and time. She appears well-developed and well-nourished. No distress.   HENT:   Head: Normocephalic and atraumatic.   Right Ear: Tympanic membrane, external ear and ear canal normal. Tympanic membrane is not scarred and not perforated. Tympanic membrane mobility is normal. No middle ear effusion. Decreased hearing is noted.   Left Ear: External ear and ear canal normal. Tympanic membrane is not scarred and not perforated. Tympanic membrane mobility is normal.  No middle ear effusion. Decreased hearing is noted.   Nose: Nose normal. No nasal deformity or septal deviation.   Mouth/Throat: Uvula is midline, oropharynx is clear and moist and mucous membranes are normal. No oropharyngeal exudate.   Eyes: Conjunctivae and EOM are normal. Pupils are equal, round, and reactive to light.   Neck: Normal range of motion. Neck supple.   Cardiovascular: Regular rhythm and intact distal pulses.    Pulmonary/Chest: No stridor. No respiratory distress.   Musculoskeletal: Normal range of motion.   Lymphadenopathy:     She has no cervical adenopathy.   Neurological: She is alert and oriented to person, place, and time. No cranial nerve deficit.   Skin: Skin is warm and dry.   Psychiatric: She has a normal mood and affect.               Assessment:       1. Asymmetrical hearing loss, bilateral        Plan:       Lis was seen today for hearing loss.     Diagnoses and all orders for this visit:     Asymmetrical hearing loss, bilateral  -     Ambulatory referral to Audiology  -     CT Temporal Bone  without contrast; Future     Other orders  -     Pneumococcal Polysaccharide Vaccine (23 Valent) (SQ/IM); Future  -     Pneumococcal Conjugate Vaccine (13 Valent) (IM); Future        Patient to have cochlear implant evaluation for left ear. If indicated appropriate imaging, medical and insurance approvals will be obtained. Risks, complications, alternatives and goals discussed. This included infection, bleeding, facial nerve and chorda problems, extrusion, failure of device, need for revision and lack of efficacy. All questions were answered.     There is no middle ear infection, the cochlear lumen is suited to implantation and there are no lesions in the auditory nerve or brain.     There are no contraindications to surgery.    To OR for left cochlear implant.

## 2017-07-14 ENCOUNTER — OFFICE VISIT (OUTPATIENT)
Dept: OTOLARYNGOLOGY | Facility: CLINIC | Age: 57
End: 2017-07-14
Payer: COMMERCIAL

## 2017-07-14 VITALS — HEIGHT: 65 IN | WEIGHT: 239.63 LBS | BODY MASS INDEX: 39.92 KG/M2

## 2017-07-14 DIAGNOSIS — Z09 FOLLOW-UP EXAMINATION AFTER EAR SURGERY: ICD-10-CM

## 2017-07-14 PROCEDURE — 99999 PR PBB SHADOW E&M-EST. PATIENT-LVL III: CPT | Mod: PBBFAC,,, | Performed by: OTOLARYNGOLOGY

## 2017-07-14 PROCEDURE — 99024 POSTOP FOLLOW-UP VISIT: CPT | Mod: S$GLB,,, | Performed by: OTOLARYNGOLOGY

## 2017-07-14 NOTE — PROGRESS NOTES
POD#1 s/p Left CI Doing well, pain controlled, denies excessive drainage from dressing site.      Dressing removed.   No evidence of hematoma or seroma. Steristrips intact.    Facial nerve function normal. Packing dry and intact.     Routine re check in one week 7/21/17 with appropriate water precautions.

## 2017-07-21 ENCOUNTER — OFFICE VISIT (OUTPATIENT)
Dept: OTOLARYNGOLOGY | Facility: CLINIC | Age: 57
End: 2017-07-21
Payer: COMMERCIAL

## 2017-07-21 VITALS — WEIGHT: 233 LBS | HEIGHT: 65 IN | BODY MASS INDEX: 38.82 KG/M2

## 2017-07-21 DIAGNOSIS — Z98.890 HISTORY OF EAR SURGERY: Primary | ICD-10-CM

## 2017-07-21 PROCEDURE — 99999 PR PBB SHADOW E&M-EST. PATIENT-LVL III: CPT | Mod: PBBFAC,,, | Performed by: OTOLARYNGOLOGY

## 2017-07-21 PROCEDURE — 99024 POSTOP FOLLOW-UP VISIT: CPT | Mod: S$GLB,,, | Performed by: OTOLARYNGOLOGY

## 2017-07-21 NOTE — PROGRESS NOTES
1 wk s/p Left CI Doing well, complains of left sided incisional pain, denies excessive drainage from dressing site. Remains on PO abx and PO pain meds.      No evidence of hematoma or seroma from incision site. Steristrips removed   Facial nerve function normal. No fluctuance or drainage from incision site.

## 2017-08-11 ENCOUNTER — CLINICAL SUPPORT (OUTPATIENT)
Dept: AUDIOLOGY | Facility: CLINIC | Age: 57
End: 2017-08-11
Payer: COMMERCIAL

## 2017-08-11 DIAGNOSIS — H90.3 SENSORINEURAL HEARING LOSS, BILATERAL: Primary | ICD-10-CM

## 2017-08-11 PROCEDURE — 92603 COCHLEAR IMPLT F/UP EXAM 7/>: CPT | Mod: S$GLB,,, | Performed by: AUDIOLOGIST

## 2017-08-16 NOTE — PROGRESS NOTES
"COCHLEAR IMPLANT INITIAL STIMULATION  LEFT EAR  Mynor N6 (questionable hybrid user)   Mynor BARNEY  DOS:  7/13/17  DIS:  8/11/17  LARGE REMOTE  TV STREAMER  MINI CHINTAN 2  PHONE CLIP    Ms. Montoya was seen today for her left ear cochlear implant initial stimulation.  She reminded me that she has never used a hearing aid for this ear but instead a cros device.  This ear has had significant high frequency hearing loss for 20 years.  With that being said, her cochlear implant outcome and success may be slower then if we implanted the right/better ear.      Programming consisted of impedance measures, which were within normal limits and behavioral t levels.  Ms. Montoya was a good  even responding her best through her tinnitus.  Following t level settings, live mode took place and c levels were increased to a comfortable level for her.  Ms. Montoya had tolerance issues and reported hearing only "noise"; no speech.  She was given four progressively louder maps and was counseled on wearing each for a week until she reached program four, at which point she should stay on P4 until her next CI sj't.      Wireless accessories were not introduced today.  They will be saved for the future when she is able to make sense of what she is hearing.  In the meantime, she was given the Rehabilitation Binder and encouraged to go through the listening exercises and websites to help auditory detection, discrimination, and comprehension.      MA 1month.    SInce there is some preservation of residual hearing, the acoustic component could surely be tried on a follow up visit.                  "

## 2017-09-22 ENCOUNTER — CLINICAL SUPPORT (OUTPATIENT)
Dept: AUDIOLOGY | Facility: CLINIC | Age: 57
End: 2017-09-22

## 2017-09-22 DIAGNOSIS — H90.3 SENSORINEURAL HEARING LOSS, BILATERAL: Primary | ICD-10-CM

## 2017-09-22 PROCEDURE — 92604 REPROGRAM COCHLEAR IMPLT 7/>: CPT | Mod: PBBFAC | Performed by: AUDIOLOGIST

## 2017-09-25 NOTE — PROGRESS NOTES
"CIFU one month  LEFT EAR  DOS:  7/13/17  DIS:  8/11/17  Brown N6, brown #5 magnet  Brown KANSO  Large Remote  TV Streamer  Mini Elias 2  Phone Clip    Pt was seen today for her one month post stim visit.  She states not hearing much with her implant at all, "nothing besides the noise in my head and it just makes that noise louder".  I've heard my cat once.  I don't hear environmental sounds or speech.      Ms. Montoya mentioned today that she may have an autoimmune disease.  She reported that her sister has Lupus.  Her primary doctor is now testing her for rheumatoid arthritis and for an auto immune disease.  She states that her right ear (non implant ear) is becoming worse.  She mentioned that her hearing aid audiologist is going to test the right ear at her next appointment which is just in a few days.  For that reason, the right ear wasn't tested today but instead her implant ear was tested and other tests were performed on the implant ear during programming.  Below is hear audiogram.  Ms. Montoya was very consisted with her responses to narrow band noises.  Her non implant ear was plugged up with an insert earphone during this time.  What was very strange was the fact that she raised her hand for hearing the narrow band noise, but she did not repeat any delectability to speech like sounds- spondees and/or LING sounds until the volume was cranked up to 70 dBHL.      Programming consisted of impedance measurement, which was WNL, and  NRTS, which were also normal and good.  Since her levels were set so low, she was given additional programs with an Increase in c levels.  Her second program is a big jump (10 c levels increased) from her last program, P4, which was left in its slot.  Three additional programs were given with an increase in c levels to broaden her dynamic range.      On Ms. Montoya next visit, do some behavioral t and c testing to see how she perceives the beeps.  Ms. Shelton may be called in for her outcomes " remain stagnant.   In addition, an aural rehabilitation program may be something to consider as well.    At this time, they Hybrid component was delayed but it surely is warranted on her next visit to try.  Perhaps it will give her some low frequency perception that currently the implant is not providing.    ID 2 months.

## 2017-10-16 PROBLEM — Z09 FOLLOW-UP EXAMINATION AFTER EAR SURGERY: Status: RESOLVED | Noted: 2017-07-14 | Resolved: 2017-10-16

## 2017-11-09 ENCOUNTER — CLINICAL SUPPORT (OUTPATIENT)
Dept: AUDIOLOGY | Facility: CLINIC | Age: 57
End: 2017-11-09

## 2017-11-09 DIAGNOSIS — H90.3 SENSORINEURAL HEARING LOSS, BILATERAL: Primary | ICD-10-CM

## 2017-11-09 PROCEDURE — 92604 REPROGRAM COCHLEAR IMPLT 7/>: CPT | Mod: PBBFAC | Performed by: AUDIOLOGIST

## 2017-11-09 NOTE — PROGRESS NOTES
3mo CIFU  Left Ear  DOS:  7/13/2017  DIS:  8/11/2017  Brown N6, brown #5 magnet  Brown KAN- #4 magnet (may want to change to 5 since pt says it falls off- the 4 felt fine but want her to try a 5 and monitor it)  Large Remote paired to the N6  Small Remote paired to the Kan  Tv streamer  MM 2  Phone Clip  [facility]  Ms. Montoya reports being retired and no longer has medical insurance.     Ms. Montoya was seen today for a 3 month CIFU visit.  An audiogram was done.  Narrow Band noises were heard with the implant in the 20-30 dBHL range.  There was no discernable words.  She reported only static sounds.  She did state noticing a change in static since she reached P3.  Prior to that, the static was just present all the time.  Now, she states it comes and goes to whatever is happening in her environment.  See below audio.  Her right ear was noted as taking another decrease.  This will be monitored over the next 6 months both with [facility] dept and the [facility].      Ms. Montoya states she no longer has insurance and is paying totally out of the pocket for all these visits.  Once [facility] returns [facility] then she will [facility] the [facility].  The CT was recommended to ensure proper implant placement.  Due to her very poor performance, a confirmation of placement is warranted.  Dr. Ellison may need to put an order in for this to get her approved to [facility].      They Hybrid component wasn't discussed as Ms. Montoya left ear thresholds declined more.  Instead, her electrical map was fine tuned and new map strategy of 500 Hz rate was tried.  This 500 Hz rate was no different then her 900 Hz rate.  Ms. Montoya just reported static again.      It would be a good idea to contact Ms. Shelton from [facility] to see her if this static never decreases.  We will give it another 3 months and then an sj't will be scheduled with the [facility] rep if nothing improves.   Remember there may be an autoimmune component to this causing the decreased performance.  In the past, Ms. Montoya mentioned something about the possibility of Cogan's syndrome.  This has not been confirmed just briefly mentioned in conversation.      SC 3months.

## 2018-02-07 ENCOUNTER — CLINICAL SUPPORT (OUTPATIENT)
Dept: AUDIOLOGY | Facility: CLINIC | Age: 58
End: 2018-02-07

## 2018-02-07 DIAGNOSIS — H90.3 SENSORINEURAL HEARING LOSS, BILATERAL: Primary | ICD-10-CM

## 2018-02-07 PROCEDURE — 99499 UNLISTED E&M SERVICE: CPT | Mod: S$PBB,,, | Performed by: AUDIOLOGIST

## 2018-02-07 NOTE — PROGRESS NOTES
6 month post stim visit  LEFT CI  Mynor N6 #4 magnet  Brown KANSO #6 magnet  Large Remote - N6  Small Remote- Kanso  Ms. Montoya no longer has medical insurance and is [facility] to get the [facility].    She is interested in pursuing not only a CT for the left ear but a second CI for her right ear.    Ms. Montoya was seen today for a CIFU visit on her left ear.  She continues to struggle hearing through her implant.  All she states she can hear is static.  The static may change depending on the program she is wearing and the sound in the room but there is no word discrimination at all.  Unfortunately, Ms. Montoya is disappointed and sad about the performance of her implant.  She says she's almost to the point of giving up wearing it.  She was encouraged to try her new programs from today and give this some more time.  Ms. Montoya agreed.    Impedance values were normal.  NRTs were stable and normal values were obtained.  Previously, she was trying a 500 rate and a 900 rate to see if one rate sounded better then another.  She ended up wearing P4 which was a 900 rate.  Today, a CIS map was created.  She stated still static but wanted to give this a try too.  Her programs are now as follows:  N7  P1- #24 CIS loud  P2- #25 CIS softer by a decrease of 7 c levels  P3- #23 900 rate loud  P4- #16 900 rate softer    ECTOR is programmed identical to above.    WI 3-6 months if she can get [facility] to [facility] her for her programming visits.  May want to consider adding an acoustic component to the left CI.

## 2020-03-17 ENCOUNTER — DOCUMENTATION ONLY (OUTPATIENT)
Dept: AUDIOLOGY | Facility: CLINIC | Age: 60
End: 2020-03-17

## 2025-02-14 ENCOUNTER — HOSPITAL ENCOUNTER (EMERGENCY)
Facility: HOSPITAL | Age: 65
Discharge: ANOTHER HOSPITAL | End: 2025-02-15
Attending: EMERGENCY MEDICINE
Payer: OTHER GOVERNMENT

## 2025-02-14 DIAGNOSIS — S70.12XA HEMATOMA OF LEFT THIGH, INITIAL ENCOUNTER: Primary | ICD-10-CM

## 2025-02-14 DIAGNOSIS — M79.89 PAIN AND SWELLING OF LEFT LOWER LEG: ICD-10-CM

## 2025-02-14 DIAGNOSIS — M79.662 PAIN AND SWELLING OF LEFT LOWER LEG: ICD-10-CM

## 2025-02-14 DIAGNOSIS — R52 PAIN: ICD-10-CM

## 2025-02-14 LAB
ALBUMIN SERPL BCP-MCNC: 3.8 G/DL (ref 3.5–5.2)
ALP SERPL-CCNC: 90 U/L (ref 40–150)
ALT SERPL W/O P-5'-P-CCNC: 19 U/L (ref 10–44)
ANION GAP SERPL CALC-SCNC: 13 MMOL/L (ref 8–16)
APTT PPP: 32.8 SEC (ref 21–32)
AST SERPL-CCNC: 17 U/L (ref 10–40)
BASOPHILS # BLD AUTO: 0.07 K/UL (ref 0–0.2)
BASOPHILS NFR BLD: 0.6 % (ref 0–1.9)
BILIRUB SERPL-MCNC: 0.5 MG/DL (ref 0.1–1)
BUN SERPL-MCNC: 13 MG/DL (ref 8–23)
CALCIUM SERPL-MCNC: 9.4 MG/DL (ref 8.7–10.5)
CHLORIDE SERPL-SCNC: 97 MMOL/L (ref 95–110)
CO2 SERPL-SCNC: 19 MMOL/L (ref 23–29)
CREAT SERPL-MCNC: 0.8 MG/DL (ref 0.5–1.4)
DIFFERENTIAL METHOD BLD: ABNORMAL
EOSINOPHIL # BLD AUTO: 0.3 K/UL (ref 0–0.5)
EOSINOPHIL NFR BLD: 2.2 % (ref 0–8)
ERYTHROCYTE [DISTWIDTH] IN BLOOD BY AUTOMATED COUNT: 13.8 % (ref 11.5–14.5)
EST. GFR  (NO RACE VARIABLE): >60 ML/MIN/1.73 M^2
GLUCOSE SERPL-MCNC: 112 MG/DL (ref 70–110)
HCT VFR BLD AUTO: 40.1 % (ref 37–48.5)
HGB BLD-MCNC: 13.8 G/DL (ref 12–16)
IMM GRANULOCYTES # BLD AUTO: 0.05 K/UL (ref 0–0.04)
IMM GRANULOCYTES NFR BLD AUTO: 0.4 % (ref 0–0.5)
INR PPP: 1 (ref 0.8–1.2)
LYMPHOCYTES # BLD AUTO: 2.1 K/UL (ref 1–4.8)
LYMPHOCYTES NFR BLD: 18.2 % (ref 18–48)
MCH RBC QN AUTO: 28.3 PG (ref 27–31)
MCHC RBC AUTO-ENTMCNC: 34.4 G/DL (ref 32–36)
MCV RBC AUTO: 82 FL (ref 82–98)
MONOCYTES # BLD AUTO: 1 K/UL (ref 0.3–1)
MONOCYTES NFR BLD: 8.4 % (ref 4–15)
NEUTROPHILS # BLD AUTO: 8.3 K/UL (ref 1.8–7.7)
NEUTROPHILS NFR BLD: 70.2 % (ref 38–73)
NRBC BLD-RTO: 0 /100 WBC
PLATELET # BLD AUTO: 264 K/UL (ref 150–450)
PMV BLD AUTO: 9.1 FL (ref 9.2–12.9)
POTASSIUM SERPL-SCNC: 4.2 MMOL/L (ref 3.5–5.1)
PROT SERPL-MCNC: 7.3 G/DL (ref 6–8.4)
PROTHROMBIN TIME: 11 SEC (ref 9–12.5)
RBC # BLD AUTO: 4.87 M/UL (ref 4–5.4)
SODIUM SERPL-SCNC: 129 MMOL/L (ref 136–145)
WBC # BLD AUTO: 11.77 K/UL (ref 3.9–12.7)

## 2025-02-14 PROCEDURE — 36415 COLL VENOUS BLD VENIPUNCTURE: CPT | Performed by: NURSE PRACTITIONER

## 2025-02-14 PROCEDURE — 25500020 PHARM REV CODE 255: Performed by: NURSE PRACTITIONER

## 2025-02-14 PROCEDURE — 96376 TX/PRO/DX INJ SAME DRUG ADON: CPT

## 2025-02-14 PROCEDURE — 96361 HYDRATE IV INFUSION ADD-ON: CPT

## 2025-02-14 PROCEDURE — 99285 EMERGENCY DEPT VISIT HI MDM: CPT | Mod: 25

## 2025-02-14 PROCEDURE — 73562 X-RAY EXAM OF KNEE 3: CPT | Mod: TC,LT

## 2025-02-14 PROCEDURE — 93971 EXTREMITY STUDY: CPT | Mod: TC,LT

## 2025-02-14 PROCEDURE — 80053 COMPREHEN METABOLIC PANEL: CPT | Performed by: NURSE PRACTITIONER

## 2025-02-14 PROCEDURE — 96375 TX/PRO/DX INJ NEW DRUG ADDON: CPT

## 2025-02-14 PROCEDURE — 85730 THROMBOPLASTIN TIME PARTIAL: CPT | Performed by: NURSE PRACTITIONER

## 2025-02-14 PROCEDURE — 85610 PROTHROMBIN TIME: CPT | Performed by: NURSE PRACTITIONER

## 2025-02-14 PROCEDURE — 85025 COMPLETE CBC W/AUTO DIFF WBC: CPT | Performed by: NURSE PRACTITIONER

## 2025-02-14 PROCEDURE — 63600175 PHARM REV CODE 636 W HCPCS: Performed by: NURSE PRACTITIONER

## 2025-02-14 PROCEDURE — 96374 THER/PROPH/DIAG INJ IV PUSH: CPT

## 2025-02-14 PROCEDURE — 25000003 PHARM REV CODE 250: Performed by: NURSE PRACTITIONER

## 2025-02-14 PROCEDURE — 73706 CT ANGIO LWR EXTR W/O&W/DYE: CPT | Mod: TC

## 2025-02-14 PROCEDURE — 63600175 PHARM REV CODE 636 W HCPCS: Performed by: EMERGENCY MEDICINE

## 2025-02-14 RX ORDER — ONDANSETRON HYDROCHLORIDE 2 MG/ML
4 INJECTION, SOLUTION INTRAVENOUS
Status: COMPLETED | OUTPATIENT
Start: 2025-02-14 | End: 2025-02-14

## 2025-02-14 RX ORDER — MORPHINE SULFATE 2 MG/ML
4 INJECTION, SOLUTION INTRAMUSCULAR; INTRAVENOUS
Status: COMPLETED | OUTPATIENT
Start: 2025-02-14 | End: 2025-02-14

## 2025-02-14 RX ADMIN — MORPHINE SULFATE 4 MG: 2 INJECTION, SOLUTION INTRAMUSCULAR; INTRAVENOUS at 06:02

## 2025-02-14 RX ADMIN — SODIUM CHLORIDE 500 ML: 9 INJECTION, SOLUTION INTRAVENOUS at 06:02

## 2025-02-14 RX ADMIN — ONDANSETRON 4 MG: 2 INJECTION INTRAMUSCULAR; INTRAVENOUS at 06:02

## 2025-02-14 RX ADMIN — IOHEXOL 100 ML: 350 INJECTION, SOLUTION INTRAVENOUS at 08:02

## 2025-02-14 RX ADMIN — MORPHINE SULFATE 4 MG: 2 INJECTION, SOLUTION INTRAMUSCULAR; INTRAVENOUS at 07:02

## 2025-02-15 VITALS
TEMPERATURE: 99 F | WEIGHT: 215 LBS | SYSTOLIC BLOOD PRESSURE: 151 MMHG | BODY MASS INDEX: 35.82 KG/M2 | HEIGHT: 65 IN | RESPIRATION RATE: 20 BRPM | DIASTOLIC BLOOD PRESSURE: 72 MMHG | HEART RATE: 80 BPM | OXYGEN SATURATION: 95 %

## 2025-02-15 PROCEDURE — 63600175 PHARM REV CODE 636 W HCPCS: Performed by: EMERGENCY MEDICINE

## 2025-02-15 PROCEDURE — 96376 TX/PRO/DX INJ SAME DRUG ADON: CPT

## 2025-02-15 RX ORDER — MORPHINE SULFATE 2 MG/ML
4 INJECTION, SOLUTION INTRAMUSCULAR; INTRAVENOUS
Status: COMPLETED | OUTPATIENT
Start: 2025-02-15 | End: 2025-02-15

## 2025-02-15 RX ADMIN — MORPHINE SULFATE 4 MG: 2 INJECTION, SOLUTION INTRAMUSCULAR; INTRAVENOUS at 12:02

## 2025-02-15 NOTE — ED TRIAGE NOTES
"Present with c/o " because of my left knee I felt a popping" pt [place] feeling " I felt it pop it started burning then it started swelling" present with severe swelling to L knee, pt reports 3 wks ago falling down from a step stool and landing on L knee,     Pt reports she experienced swelling and pain to that knee after the fall and [facility] and was diagnosed with cellulitis and placed on antibiotics     Currently present with severe swelling/pain to L knee, swelling extends to L lower leg/L foot,   "

## 2025-02-15 NOTE — ED NOTES
Pt engaged call light and requested something for pain. Pt reports pain is a 8 on 0/10 pain scale. MD notified.

## 2025-02-15 NOTE — ED PROVIDER NOTES
"Encounter Date: 2/14/2025       History     Chief Complaint   Patient presents with    Knee Pain     Patient reports left knee "popped" today while sitting down.  Patient reports three weeks ago she fell and struck the same knee of a floor.  Recent rash to the same leg being treated for cellulitis with oral antibiotics this week.  Left knee is painful and swollen since the pop today.     POV the ED with family.  Patient complains of left knee pain with swelling.  Patient states that about 3 weeks ago she bumped the left knee.  At that time she had mild tenderness and mild swelling for several days, swelling in the left lower extremity.  She did not seek treatment for the knee injury at that time.  [facility] and [facility] for lower left leg cellulitis.  [place] and had sudden onset of increased swelling and pain in the left knee.  She felt a "Pop and burning sensation" in the left knee.  She denies new fall or new injury.  Unable to bear weight.  Denies chest pain or SOB.  No other complaints    The history is provided by the patient. No  was used.     Review of patient's allergies indicates:  No Known Allergies  Past Medical History:   Diagnosis Date    COPD (chronic obstructive pulmonary disease)     Hypertension      Past Surgical History:   Procedure Laterality Date    CHOLECYSTECTOMY      IMPLANTATION OF COCHLEAR PROSTHESIS       No family history on file.  Social History     Tobacco Use    Smoking status: Every Day     Current packs/day: 1.50     Average packs/day: 1.5 packs/day for 40.0 years (60.0 ttl pk-yrs)     Types: Cigarettes    Smokeless tobacco: Never   Substance Use Topics    Alcohol use: No    Drug use: Never     Review of Systems   Constitutional:  Negative for chills and fever.   Musculoskeletal:         Left knee pain and swelling, left lower extremity swelling for several weeks   All other " systems reviewed and are negative.      Physical Exam     Initial Vitals [02/14/25 1753]   BP Pulse Resp Temp SpO2   (!) 186/109 89 18 98 °F (36.7 °C) 97 %      MAP       --         Physical Exam    Nursing note and vitals reviewed.  Constitutional: She appears well-developed and well-nourished. No distress.   HENT:   Head: Normocephalic and atraumatic. Mouth/Throat: Oropharynx is clear and moist.   Eyes: Pupils are equal, round, and reactive to light.   Neck:   Normal range of motion.  Cardiovascular:  Normal rate and regular rhythm.           Pulmonary/Chest: No respiratory distress.   Scattered coarse lung sounds, faint expiratory wheezing.  Tobacco use.  No acute changes per patient   Abdominal: Abdomen is soft. There is no abdominal tenderness.   Musculoskeletal:      Cervical back: Normal range of motion.      Comments: Marked swelling to the left knee.  Discoloration.  Firm tight skin, concerns for hematoma, active bleeding. Left lower extremity edema compared to right lower extremity. No redness or warmth, sensation intact     Neurological: She is alert and oriented to person, place, and time. GCS score is 15. GCS eye subscore is 4. GCS verbal subscore is 5. GCS motor subscore is 6.   Skin: Skin is warm and dry. Capillary refill takes less than 2 seconds.   Psychiatric: She has a normal mood and affect. Thought content normal.         ED Course   Critical Care    Date/Time: 2/14/2025 8:00 PM    Performed by: Sudheer Miller MD  Authorized by: Sudheer Miller MD  Direct patient critical care time: 15 minutes  Additional history critical care time: 5 minutes  Ordering / reviewing critical care time: 7 minutes  Documentation critical care time: 7 minutes  Total critical care time (exclusive of procedural time) : 34 minutes  Critical care time was exclusive of separately billable procedures and treating other patients and teaching time.  Critical care was necessary to treat or prevent imminent or  life-threatening deterioration of the following conditions: active hemorrhage.  Critical care was time spent personally by me on the following activities: blood draw for specimens, development of treatment plan with patient or surrogate, interpretation of cardiac output measurements, evaluation of patient's response to treatment, examination of patient, obtaining history from patient or surrogate, ordering and performing treatments and interventions, ordering and review of laboratory studies, ordering and review of radiographic studies, pulse oximetry, re-evaluation of patient's condition and review of old charts.        Labs Reviewed   CBC W/ AUTO DIFFERENTIAL - Abnormal       Result Value    WBC 11.77      RBC 4.87      Hemoglobin 13.8      Hematocrit 40.1      MCV 82      MCH 28.3      MCHC 34.4      RDW 13.8      Platelets 264      MPV 9.1 (*)     Immature Granulocytes 0.4      Gran # (ANC) 8.3 (*)     Immature Grans (Abs) 0.05 (*)     Lymph # 2.1      Mono # 1.0      Eos # 0.3      Baso # 0.07      nRBC 0      Gran % 70.2      Lymph % 18.2      Mono % 8.4      Eosinophil % 2.2      Basophil % 0.6      Differential Method Automated     COMPREHENSIVE METABOLIC PANEL - Abnormal    Sodium 129 (*)     Potassium 4.2      Chloride 97      CO2 19 (*)     Glucose 112 (*)     BUN 13      Creatinine 0.8      Calcium 9.4      Total Protein 7.3      Albumin 3.8      Total Bilirubin 0.5      Alkaline Phosphatase 90      AST 17      ALT 19      eGFR >60.0      Anion Gap 13     APTT - Abnormal    aPTT 32.8 (*)    PROTIME-INR    Prothrombin Time 11.0      INR 1.0            Imaging Results               CTA Lower Extremity (Final result)  Result time 02/14/25 23:03:02      Final result by Shivani Dang MD (02/14/25 23:03:02)                   Impression:      Large subcutaneous hematoma to the anteromedial distal thigh and knee measuring 15.4 cm craniocaudal, 10 cm transverse, and 5.5 cm katia posterior. Within the  hematoma there is a small focus of active bleeding/contrast extravasation at the level of the patella (series 6, image 53) and additional focus of extravasation / active bleeding slightly inferiorly (series 6, image 65).  Visualized popliteal artery, tibioperoneal trunk, posterior tibial artery, anterior tibial artery, and peroneal artery are patent.    Subacute nondisplaced intra-articular fracture of the fibular head.    This report was flagged in Epic as abnormal.      Electronically signed by: Shivani Dang  Date:    02/14/2025  Time:    23:03               Narrative:    EXAMINATION:  CTA LOWER EXTREMITY    CLINICAL HISTORY:  pain swelling left knee;    TECHNIQUE:  CT angiogram left lower extremity after 100 cc intravenous contrast field of view extends from the distal femur to the ankle.    COMPARISON:  None    FINDINGS:  Bones/joints: Subacute nondisplaced intra-articular fracture of the fibular head.    Large subcutaneous hematoma to the anteromedial distal thigh and knee measuring 15.4 cm craniocaudal, 10 cm transverse, and 5.5 cm katia posterior.  Within the hematoma there is a small focus of active bleeding/contrast extravasation at the level of the patella (series 6, image 53) and additional focus of extravasation / active bleeding slightly inferiorly (series 6, image 65).  Visualized popliteal artery, tibioperoneal trunk, posterior tibial artery, anterior tibial artery, and peroneal artery are patent.                                       US Lower Extremity Veins Left (Final result)  Result time 02/14/25 20:29:54      Final result by Shivani Dang MD (02/14/25 20:29:54)                   Impression:      No evidence of deep venous thrombosis in the left lower extremity.      Electronically signed by: Shivani Dang  Date:    02/14/2025  Time:    20:29               Narrative:    EXAMINATION:  US LOWER EXTREMITY VEINS LEFT    CLINICAL HISTORY:  Pain in left lower  leg    TECHNIQUE:  Duplex and color flow Doppler evaluation and graded compression of the left lower extremity veins was performed.    COMPARISON:  None    FINDINGS:  Left thigh veins: The common femoral, femoral, popliteal, upper greater saphenous, and deep femoral veins are patent and free of thrombus. The veins are normally compressible and have normal phasic flow and augmentation response.    Left calf veins: The visualized calf veins are patent.    Contralateral CFV: The contralateral (right) common femoral vein is patent and free of thrombus.    Miscellaneous: None                                       X-Ray Knee 3 View Left (Final result)  Result time 02/14/25 18:46:04      Final result by Shivani Dang MD (02/14/25 18:46:04)                   Impression:      Marked prepatellar soft tissue fullness.  Correlate for prepatellar bursitis.      Electronically signed by: Shivani Dang  Date:    02/14/2025  Time:    18:46               Narrative:    EXAMINATION:  XR KNEE 3 VIEW LEFT    CLINICAL HISTORY:  Pain, unspecified    TECHNIQUE:  AP, lateral, and Merchant views of the left knee were performed.    COMPARISON:  None    FINDINGS:  No acute fracture or dislocation.  Mild tricompartment osteoarthritis.  Small joint effusion.  Marked prepatellar soft tissue fullness.                                    X-Rays:   Independently Interpreted Readings:   Other Readings:  EXAMINATION:  XR KNEE 3 VIEW LEFT     CLINICAL HISTORY:  Pain, unspecified     TECHNIQUE:  AP, lateral, and Merchant views of the left knee were performed.     COMPARISON:  None     FINDINGS:  No acute fracture or dislocation.  Mild tricompartment osteoarthritis.  Small joint effusion.  Marked prepatellar soft tissue fullness.     Impression:     Marked prepatellar soft tissue fullness.  Correlate for prepatellar bursitis.     EXAMINATION:  US LOWER EXTREMITY VEINS LEFT     CLINICAL HISTORY:  Pain in left lower leg     TECHNIQUE:  Duplex  and color flow Doppler evaluation and graded compression of the left lower extremity veins was performed.     COMPARISON:  None     FINDINGS:  Left thigh veins: The common femoral, femoral, popliteal, upper greater saphenous, and deep femoral veins are patent and free of thrombus. The veins are normally compressible and have normal phasic flow and augmentation response.     Left calf veins: The visualized calf veins are patent.     Contralateral CFV: The contralateral (right) common femoral vein is patent and free of thrombus.     Miscellaneous: None     Impression:     No evidence of deep venous thrombosis in the left lower extremity.        Medications   morphine injection 4 mg (4 mg Intravenous Given 2/14/25 1838)   ondansetron injection 4 mg (4 mg Intravenous Given 2/14/25 1838)   sodium chloride 0.9% bolus 500 mL 500 mL (0 mLs Intravenous Stopped 2/14/25 1939)   iohexoL (OMNIPAQUE 350) injection 100 mL (100 mLs Intravenous Given 2/14/25 2032)   morphine injection 4 mg (4 mg Intravenous Given 2/14/25 1947)   morphine injection 4 mg (4 mg Intravenous Given 2/15/25 0036)     Medical Decision Making  Presents for evaluation of left knee pain, see HPI  Differentials include but not limited to dehydration, volume depletion, abnormal electrolytes, anemia, fracture, sprain, strain, contusion, hematoma, seroma, DVT, SVT, bursitis  Discussed with phoebe Burroughs patient in ED  @ 1945 Review of labs: Na 129, CO2 19, glucose 112, awaiting US and CTA results  @ 2059, US negative for DVT, awaiting CTA results.  X-ray read marked prepatellar soft tissue fullness.  Correlate for prepatellar bursitis.         Amount and/or Complexity of Data Reviewed  Labs: ordered. Decision-making details documented in ED Course.  Radiology: ordered. Decision-making details documented in ED Course.    Risk  OTC drugs.  Prescription drug management.               ED Course as of 02/16/25 0043   Fri Feb 14, 2025 1943    Comprehensive  Metabolic Panel (CMP)    Final resultCollected 02/14 18:39    Sodium 129  CO2 19  Glucose 112    APTT    Final resultCollected 02/14 18:39    PTT 32.8    Complete Blood Count (CBC)    Final resultCollected 02/14 18:39    MPV 9.1  Gran # (ANC) 8.3  Immature Grans (Abs) 0.05          [CP]      ED Course User Index  [CP] Estela De La Torre NP            Patient has active hemorrhage with two bleed sites in her thigh and expanding hematoma; iced; transfer for definitive management.       Patient accepted for transfer for orthopedics with OR services (we do not have OR open on weekend).     See Patient Flow Center encounter for facility timing and details.             Clinical Impression:  Final diagnoses:  [R52] Pain  [M79.662, M79.89] Pain and swelling of left lower leg  [S70.12XA] Hematoma of left thigh, initial encounter (Primary)          ED Disposition Condition    Transfer to Another Facility Stable                Estela De La Torre NP  02/14/25 1942       Estela De La Torre NP  02/14/25 1945       Estela De La Torre NP  02/14/25 2050       Estela De La Torre NP  02/14/25 2101       Estela De La Torre NP  02/14/25 2137       Sudheer Miller MD  02/16/25 0045

## (undated) DEVICE — CLIPPER BLADE MOD 4406 (CAREF)

## (undated) DEVICE — Device

## (undated) DEVICE — SOL IRR WATER STRL 3000 ML

## (undated) DEVICE — SUT 3/0 18IN COATED VICRYLP

## (undated) DEVICE — FORCEP CURVED DISP

## (undated) DEVICE — KIT EAR EAOFE

## (undated) DEVICE — KIT DRESS GLASSCK EAR ADLT ST

## (undated) DEVICE — BURR DIAMOND FINE 1.0X48

## (undated) DEVICE — SYR SLIP TIP 1CC

## (undated) DEVICE — SEE MEDLINE ITEM 152622

## (undated) DEVICE — SOL IRR NACL .9% 3000ML

## (undated) DEVICE — SUCTION SURGICAL FRAZR

## (undated) DEVICE — BURR ROUND DIAMOND 1.5X48MM

## (undated) DEVICE — SPONGE GAUZE 16PLY 4X4

## (undated) DEVICE — KIT SUCTION IRRIGATION

## (undated) DEVICE — ELECTRODE NDL

## (undated) DEVICE — BURR LSO ROUND FLUTED 5MM

## (undated) DEVICE — SUT BONE WAX 2.5 GRMS 12/BX

## (undated) DEVICE — CLOSURE SKIN STERI STRIP 1/2X4

## (undated) DEVICE — BURR CUTT CARB 3X38 E9000 FLUT

## (undated) DEVICE — ELECTRODE REM PLYHSV RETURN 9

## (undated) DEVICE — COVERS PROBE NR-48 STERILE

## (undated) DEVICE — SEE MEDLINE ITEM 157128

## (undated) DEVICE — CORD BIPOLAR 12 FOOT

## (undated) DEVICE — BIT DRILL TUBING